# Patient Record
Sex: MALE | Race: BLACK OR AFRICAN AMERICAN | URBAN - METROPOLITAN AREA
[De-identification: names, ages, dates, MRNs, and addresses within clinical notes are randomized per-mention and may not be internally consistent; named-entity substitution may affect disease eponyms.]

---

## 2016-12-31 NOTE — ED BEHAVIORAL HEALTH NOTE - BEHAVIORAL HEALTH NOTE
TELEPSYCHIATRY ATTENDING NOTE (ADDENDUM TO PSYCHIATRIC ASSESSMENT FROM EARLIER THIS EVENING): During initial evaluation, Patient was offered voluntary admission which he declined. Patient returned to the [facility] ED after discharge a short time later this time seeking voluntary psychiatric inpatient admission to [address]. Patient can sign himself in voluntarily (legal document 9.13) and admitted to [facility]. Writer already gave verbal notification to Unit [facility]; they have a male bed available. TELEPSYCHIATRY ATTENDING NOTE (ADDENDUM TO PSYCHIATRIC ASSESSMENT FROM EARLIER THIS EVENING): During initial evaluation, Patient was offered voluntary admission which he declined. Patient returned to the [facility] ED after discharge a short time later this time seeking voluntary psychiatric inpatient admission to [facility].   PLAN:   Patient can sign himself in voluntarily (legal document 9.13)   - admit to [facility] / Dr KEN Jang (Writer already gave verbal notification to [facility]; they have a male bed available)  - reason for admission: psychosis; medication side effects/management   - routine checks; no CO needed  - no active medical issues  - substances are not a factor in this case  - regular diet  - ambulate as tolerated  - risperdal 1mg PO qhs, hydroxyzine 50mg PO qhs + PRNs of haldol/Ativan/benadryl

## 2016-12-31 NOTE — ED PROVIDER NOTE - PHYSICAL EXAMINATION
Gen: Alert, NAD  Head: NC, AT   Eyes: PERRL, EOMI, normal lids/conjunctiva  ENT: normal hearing, patent oropharynx without erythema/exudate, uvula midline  Neck: supple, no tenderness, Trachea midline  Pulm: Bilateral BS, normal resp effort, no wheeze/stridor/retractions  CV: RRR, no M/R/G, 2+ radial and dp pulses bl, no edema  Abd: soft, NT/ND, +BS, no hepatosplenomegaly  Mskel: extremities x4 with normal ROM and no joint effusions. no ctl spine ttp.   Skin: no rash, no bruising   Neuro: AAOx3, no sensory/motor deficits, CN 2-12 intact

## 2016-12-31 NOTE — ED PROVIDER NOTE - OBJECTIVE STATEMENT
Pertinent PMH/PSH/FHx/SHx and Review of Systems contained within:  26m hx of psychosis here earlierier today requests voluntary psych admission. after dc from earlier ed visit went home and had acute episode of anger where he threw food. still no hi, si but wants to get help with his anxiety and anger. believes he cannot control himself.   Fh and Sh not otherwise contributory  ROS otherwise negative

## 2016-12-31 NOTE — ED ADULT TRIAGE NOTE - CHIEF COMPLAINT QUOTE
" I am here to be admitted" Pt seen by psychiatry and discharged approx 1 hour ago and discharged home.  Pt calm and cooperative at triage.

## 2017-09-06 ENCOUNTER — EMERGENCY (EMERGENCY)
Facility: HOSPITAL | Age: 27
LOS: 0 days | Discharge: HOME | End: 2017-09-07
Attending: EMERGENCY MEDICINE
Payer: SELF-PAY

## 2017-09-06 VITALS
WEIGHT: 169.98 LBS | OXYGEN SATURATION: 97 % | DIASTOLIC BLOOD PRESSURE: 65 MMHG | HEART RATE: 74 BPM | SYSTOLIC BLOOD PRESSURE: 121 MMHG | HEIGHT: 70 IN | TEMPERATURE: 98 F | RESPIRATION RATE: 20 BRPM

## 2017-09-06 LAB
ALBUMIN SERPL ELPH-MCNC: 4.1 G/DL — SIGNIFICANT CHANGE UP (ref 3.3–5)
ALP SERPL-CCNC: 77 U/L — SIGNIFICANT CHANGE UP (ref 40–120)
ALT FLD-CCNC: 32 U/L — SIGNIFICANT CHANGE UP (ref 12–78)
AMPHET UR-MCNC: NEGATIVE — SIGNIFICANT CHANGE UP
ANION GAP SERPL CALC-SCNC: 9 MMOL/L — SIGNIFICANT CHANGE UP (ref 5–17)
APAP SERPL-MCNC: <2 UG/ML — LOW (ref 10–30)
AST SERPL-CCNC: 23 U/L — SIGNIFICANT CHANGE UP (ref 15–37)
BARBITURATES UR SCN-MCNC: NEGATIVE — SIGNIFICANT CHANGE UP
BASOPHILS # BLD AUTO: 0.1 K/UL — SIGNIFICANT CHANGE UP (ref 0–0.2)
BASOPHILS NFR BLD AUTO: 2 % — SIGNIFICANT CHANGE UP (ref 0–2)
BENZODIAZ UR-MCNC: NEGATIVE — SIGNIFICANT CHANGE UP
BILIRUB SERPL-MCNC: 0.4 MG/DL — SIGNIFICANT CHANGE UP (ref 0.2–1.2)
BUN SERPL-MCNC: 14 MG/DL — SIGNIFICANT CHANGE UP (ref 7–23)
CALCIUM SERPL-MCNC: 9 MG/DL — SIGNIFICANT CHANGE UP (ref 8.5–10.1)
CHLORIDE SERPL-SCNC: 106 MMOL/L — SIGNIFICANT CHANGE UP (ref 96–108)
CO2 SERPL-SCNC: 26 MMOL/L — SIGNIFICANT CHANGE UP (ref 22–31)
COCAINE METAB.OTHER UR-MCNC: NEGATIVE — SIGNIFICANT CHANGE UP
CREAT SERPL-MCNC: 1.19 MG/DL — SIGNIFICANT CHANGE UP (ref 0.5–1.3)
EOSINOPHIL # BLD AUTO: 0.4 K/UL — SIGNIFICANT CHANGE UP (ref 0–0.5)
EOSINOPHIL NFR BLD AUTO: 6.4 % — HIGH (ref 0–6)
ETHANOL SERPL-MCNC: <10 MG/DL — SIGNIFICANT CHANGE UP (ref 0–10)
GLUCOSE SERPL-MCNC: 106 MG/DL — HIGH (ref 70–99)
HCT VFR BLD CALC: 46.2 % — SIGNIFICANT CHANGE UP (ref 39–50)
HGB BLD-MCNC: 15.3 G/DL — SIGNIFICANT CHANGE UP (ref 13–17)
LYMPHOCYTES # BLD AUTO: 2.2 K/UL — SIGNIFICANT CHANGE UP (ref 1–3.3)
LYMPHOCYTES # BLD AUTO: 32.5 % — SIGNIFICANT CHANGE UP (ref 13–44)
MCHC RBC-ENTMCNC: 31.6 PG — SIGNIFICANT CHANGE UP (ref 27–34)
MCHC RBC-ENTMCNC: 33.2 GM/DL — SIGNIFICANT CHANGE UP (ref 32–36)
MCV RBC AUTO: 95.2 FL — SIGNIFICANT CHANGE UP (ref 80–100)
METHADONE UR-MCNC: NEGATIVE — SIGNIFICANT CHANGE UP
MONOCYTES # BLD AUTO: 0.6 K/UL — SIGNIFICANT CHANGE UP (ref 0–0.9)
MONOCYTES NFR BLD AUTO: 8.8 % — SIGNIFICANT CHANGE UP (ref 2–14)
NEUTROPHILS # BLD AUTO: 3.4 K/UL — SIGNIFICANT CHANGE UP (ref 1.8–7.4)
NEUTROPHILS NFR BLD AUTO: 50.2 % — SIGNIFICANT CHANGE UP (ref 43–77)
OPIATES UR-MCNC: NEGATIVE — SIGNIFICANT CHANGE UP
PCP SPEC-MCNC: SIGNIFICANT CHANGE UP
PCP UR-MCNC: NEGATIVE — SIGNIFICANT CHANGE UP
PLATELET # BLD AUTO: 247 K/UL — SIGNIFICANT CHANGE UP (ref 150–400)
POTASSIUM SERPL-MCNC: 3.8 MMOL/L — SIGNIFICANT CHANGE UP (ref 3.5–5.3)
POTASSIUM SERPL-SCNC: 3.8 MMOL/L — SIGNIFICANT CHANGE UP (ref 3.5–5.3)
PROT SERPL-MCNC: 8.4 GM/DL — HIGH (ref 6–8.3)
RBC # BLD: 4.86 M/UL — SIGNIFICANT CHANGE UP (ref 4.2–5.8)
RBC # FLD: 12.6 % — SIGNIFICANT CHANGE UP (ref 11–15)
SALICYLATES SERPL-MCNC: <1.7 MG/DL — LOW (ref 2.8–20)
SODIUM SERPL-SCNC: 141 MMOL/L — SIGNIFICANT CHANGE UP (ref 135–145)
THC UR QL: POSITIVE — SIGNIFICANT CHANGE UP
WBC # BLD: 6.7 K/UL — SIGNIFICANT CHANGE UP (ref 3.8–10.5)
WBC # FLD AUTO: 6.7 K/UL — SIGNIFICANT CHANGE UP (ref 3.8–10.5)

## 2017-09-06 PROCEDURE — 90792 PSYCH DIAG EVAL W/MED SRVCS: CPT | Mod: GT

## 2017-09-06 PROCEDURE — 99284 EMERGENCY DEPT VISIT MOD MDM: CPT

## 2017-09-06 NOTE — ED PROVIDER NOTE - PROGRESS NOTE DETAILS
Patient received on sign out from Dr. Tellez pending telepsych eval.  Patient seen and cleared by psych for outpatient treatment, see consult note, family at bedside.  Patient to follow up with Ольга garces in clinic, will treat with depakote per psych recommendation.  Discussed results and outcome of today's visit with the patient.  Patient advised to please follow up with their primary care doctor within the next 24 hours and return to the Emergency Department for worsening symptoms or any other concerns.  Patient advised that their doctor may call  to follow up on the specific results of the tests performed today in the emergency department.   Patient appears well on discharge.

## 2017-09-06 NOTE — ED ADULT NURSE REASSESSMENT NOTE - NS ED NURSE REASSESS COMMENT FT1
25 yo BIBA for psych eval, mother reports: "He has been treated for depression in the past, and he hasn't had treatment in months and she feels threatened by his presence. verbal and pointing in her face, where she leaves and stays at hotel. the girlfriend lives there also and is also depressed."   "I tried calling his insurance but he has not job" mother tearful, upset.
mother: Carmelo Barton can be reached at [phone], and W: [phone].

## 2017-09-06 NOTE — ED PROVIDER NOTE - OBJECTIVE STATEMENT
Pt is a 27 yo gentleman with a pmhx of prior psychiatric disease, nos per patient who presents to the ED with mother because he has not been taking his depakote due to insurance issues. Mother says that he is behaving erratically, and is acting out. Does not know if he is even sleeping. He denies any SI, HI, AVH. Mother says she cannot help take care of him anymore.

## 2017-09-06 NOTE — ED ADULT TRIAGE NOTE - CHIEF COMPLAINT QUOTE
patient here for psych evaluation , patient is not taking his medication for 2 months , patient denied SI , patient behave strange in ER

## 2017-09-06 NOTE — ED ADULT NURSE NOTE - OBJECTIVE STATEMENT
27 yo male BIBA from home, mother called, pt reports he doesn't have knowledge of why police were called. while smiling, affect. o no medication x 2 months Depakote unsure of dosage, pt verbalized "I have no insurance x 2 months." denies SI , not working at the moment.

## 2017-09-07 VITALS
DIASTOLIC BLOOD PRESSURE: 74 MMHG | RESPIRATION RATE: 20 BRPM | OXYGEN SATURATION: 100 % | SYSTOLIC BLOOD PRESSURE: 116 MMHG | HEART RATE: 65 BPM | TEMPERATURE: 98 F

## 2017-09-07 DIAGNOSIS — F43.25 ADJUSTMENT DISORDER WITH MIXED DISTURBANCE OF EMOTIONS AND CONDUCT: ICD-10-CM

## 2017-09-07 DIAGNOSIS — F32.9 MAJOR DEPRESSIVE DISORDER, SINGLE EPISODE, UNSPECIFIED: ICD-10-CM

## 2017-09-07 DIAGNOSIS — F41.9 ANXIETY DISORDER, UNSPECIFIED: ICD-10-CM

## 2017-09-07 DIAGNOSIS — F29 UNSPECIFIED PSYCHOSIS NOT DUE TO A SUBSTANCE OR KNOWN PHYSIOLOGICAL CONDITION: ICD-10-CM

## 2017-09-07 DIAGNOSIS — Z00.8 ENCOUNTER FOR OTHER GENERAL EXAMINATION: ICD-10-CM

## 2017-09-07 DIAGNOSIS — R45.89 OTHER SYMPTOMS AND SIGNS INVOLVING EMOTIONAL STATE: ICD-10-CM

## 2017-09-07 RX ORDER — DIVALPROEX SODIUM 500 MG/1
1 TABLET, DELAYED RELEASE ORAL
Qty: 30 | Refills: 0 | OUTPATIENT
Start: 2017-09-07 | End: 2017-09-22

## 2017-09-07 NOTE — ED BEHAVIORAL HEALTH ASSESSMENT NOTE - ADDITIONAL DETAILS / COMMENTS
Patient stopped taking his medications and did not continue with outpatient treatment following psychiatric hospitalization because he thought the medication was only slightly helpful, and he wasn't aware of it having a strong effect. Per girlfriend patient gets more easily agitated and upset when he drinks alcohol, and in the past 2 weeks patient has increased his alcohol intake.

## 2017-09-07 NOTE — ED BEHAVIORAL HEALTH ASSESSMENT NOTE - OTHER
girlfriend Alpha Juana Levine Mother Not assessed- telepsych. Not assessed- telepsych slightly slowed decreased spontaneity slightly constricted

## 2017-09-07 NOTE — ED BEHAVIORAL HEALTH ASSESSMENT NOTE - HPI (INCLUDE ILLNESS QUALITY, SEVERITY, DURATION, TIMING, CONTEXT, MODIFYING FACTORS, ASSOCIATED SIGNS AND SYMPTOMS)
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 Patient reports that he feels "neutral" and "fine," and defines that as not being agitated and as being appropriate given current stressors (unemployment, living with mom whom he does not have a positive relationship with, having school debt). Patient reports appetite has been fine, and his sleep varies but that this is not a change for him. Patient denies SI/HI and any other safety concerns, denies AH stating that he doesn't hear voices but "I have thoughts sometimes that people are looking at me strangely or talking about me," and reports that he doesn't understand why his mother called the police today and felt that he needed to go to the hospital. Patient states that due to insurance lapse he has not been taking his prescribed medication for the past 2 months, but that he is attempting to restart insurance and is open to taking medications again. Girlfriend is in agreement with patient's report, stating that patient has been doing "much better" since hospitalization in [place]. Girlfriend denies that patient has expressed suicidal thoughts, denies aggression/HI, denies thinking that patient is unsafe to return home. Girlfriend states that when patient gets very upset and in the context of drinking he has thrown his food down, but has never acted aggressively towards people. She states that patient can present as suspicious, and can become somewhat preoccupied thinking about why people are looking at him, but that this hasn't resulted in significant impairment in functioning for patient.    Mom reports that patient has been paranoid, curses and yells at mom, and that she believes patient has been drinking more because she saw empty bottles of wine and liquor in the home. Mom states that she wants patient to get a job and move out of her home. Mom reports she called 911 because a friend recommended that if she did so patient would be taken to hospital for evaluation. Mom denies that patient has made any threats and denies SI/HI. Mom acknowledges strained relationship with patient. Mother was unable to identify any clear reasons for thinking that patient should be hospitalized, other than not participating in outpatient treatment or taking medication and having some paranoid thoughts. 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 Patient reports that he feels "neutral" and "fine," and defines that as not being agitated and as being appropriate given current stressors (unemployment, living with mom whom he does not have a positive relationship with, having school debt). Patient reports appetite has been fine, and his sleep varies but that this is not a change for him. Patient denies SI/HI and any other safety concerns, denies AH stating that he doesn't hear voices but "I have thoughts sometimes that people are looking at me strangely or talking about me," and reports that he doesn't understand why his mother called the police today and felt that he needed to go to the hospital. Patient states that due to insurance lapse he has not been taking his prescribed medication for the past 2 months, but that he is attempting to restart insurance and is open to taking medications again. Patient is hopeful about the future, expecting to hear back about a potential job, and eager to begin school. Girlfriend is in agreement with patient's report, stating that patient has been doing "much better" since hospitalization in [place]. Girlfriend denies that patient has expressed suicidal thoughts, denies aggression/HI, denies thinking that patient is unsafe to return home. Girlfriend states that when patient gets very upset and in the context of drinking he has thrown his food down, but has never acted aggressively towards people. She states that patient can present as suspicious, and can become somewhat preoccupied thinking about why people are looking at him, but that this hasn't resulted in significant impairment in functioning for patient.    Mom reports that patient has been paranoid, curses and yells at mom, and that she believes patient has been drinking more because she saw empty bottles of wine and liquor in the home. Mom states that she wants patient to get a job and move out of her home. Mom reports she called 911 because a friend recommended that if she did so patient would be taken to hospital for evaluation. Mom denies that patient has made any threats and denies SI/HI. Mom acknowledges strained relationship with patient. Mother was unable to identify any clear reasons for thinking that patient should be hospitalized, other than not participating in outpatient treatment or taking medication and having some paranoid thoughts. 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 Patient reports that he feels "neutral" and "fine," and defines that as not being agitated and as being appropriate given current stressors (unemployment, living with mom whom he does not have a positive relationship with, having school debt). Patient reports appetite has been fine, and his sleep varies but that this is not a change for him. Patient denies SI/HI and any other safety concerns, denies AH stating that he doesn't hear voices but "I have thoughts sometimes that people are looking at me strangely or talking about me," and reports that he doesn't understand why his mother called the police today and felt that he needed to go to the hospital. Patient states that due to insurance lapse he has not been taking his prescribed medication for the past 2 months, but that he is attempting to restart insurance and is open to taking medications again. Patient is hopeful about the future, expecting to hear back about a potential job, and eager to begin school. Girlfriend is in agreement with patient's report, stating that patient has been doing "much better" since hospitalization in January. Girlfriend denies that patient has expressed suicidal thoughts, denies aggression/HI, denies thinking that patient is unsafe to return home. Girlfriend states that when patient gets very upset and in the context of drinking he has thrown his food down, but has never acted aggressively towards people. She states that patient can present as suspicious, and can become somewhat preoccupied thinking about why people are looking at him, but that this hasn't resulted in significant impairment in functioning for patient.    Mom reports that patient has been paranoid, curses and yells at mom, and that she believes patient has been drinking more because she saw empty bottles of wine and liquor in the home. Mom states that she wants patient to get a job and move out of her home. Mom reports she called 911 because a friend recommended that if she did so patient would be taken to hospital for evaluation. Mom denies that patient has made any threats and denies SI/HI. Mom acknowledges strained relationship with patient. Mother was unable to identify any clear reasons for thinking that patient should be hospitalized, other than not participating in outpatient treatment or taking medication and having some paranoid thoughts. 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 Patient reports that he feels "neutral" and "fine," and defines that as not being agitated and as being appropriate given current stressors (unemployment, living with mom whom he does not have a positive relationship with, having school debt). Patient reports appetite has been fine, and his sleep varies but that this is not a change for him. Patient denies SI/HI and any other safety concerns, denies AH stating that he doesn't hear voices but "I have thoughts sometimes that people are looking at me strangely or talking about me," and reports that he doesn't understand why his mother called the police today and felt that he needed to go to the hospital. Patient states that due to insurance lapse he has not been taking his prescribed medication for the past 2 months, but that he is attempting to restart insurance and is open to taking medications again. Patient is hopeful about the future, expecting to hear back about a potential job, and eager to begin school. Girlfriend is in agreement with patient's report, stating that patient has been doing "much better" since hospitalization in January. Girlfriend denies that patient has expressed suicidal thoughts, denies aggression/HI, denies thinking that patient is unsafe to return home. Girlfriend states that when patient gets very upset and in the context of drinking he has thrown his food down, but has never acted aggressively towards people. She states that patient can present as suspicious, and can become somewhat preoccupied thinking about why people are looking at him, but that this hasn't resulted in significant impairment in functioning for patient.    Mom reports that patient has been paranoid, curses and yells at mom, and that she believes patient has been drinking more because she saw empty bottles of wine and liquor in the home. Mom states that she wants patient to get a job and move out of her home. Mom reports she called 911 because a friend recommended that if she did so patient would be taken to hospital for evaluation. Mom denies that patient has made any threats and denies SI/HI. Mom acknowledges strained relationship with patient. Mother was unable to identify any clear reasons for thinking that patient should be hospitalized, other than not participating in outpatient treatment or taking medication and having some paranoid thoughts.    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  He states that he owes his mother money & that she has been frequently confronting him with this & berating him for not having found a job, which has led him to become upset & to isolate from her, which in turn has resulted in her becoming still more upset.

## 2017-09-07 NOTE — ED BEHAVIORAL HEALTH ASSESSMENT NOTE - RISK ASSESSMENT
Pt denies thoughts of violence or self-harm, has no history of violent or self-injurious behavior and is at low risk of harm to self or others at the present time.

## 2017-09-07 NOTE — ED BEHAVIORAL HEALTH ASSESSMENT NOTE - DESCRIPTION
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 Patient reports that he has been applying to jobs outside of t his field because he believes the environment is not healthy for him as it's fast-paced, loud, and "intense." Patient didn't finish college and is in the process of applying to go back to school. Patient and mom report a poor relationship, and mom wants patient to move out which patient plans to do in the near future. Patient was reported by ED staff to be calm and cooperative in ED. None

## 2017-09-07 NOTE — ED BEHAVIORAL HEALTH ASSESSMENT NOTE - DETAILS
Patient has had intermittent suicidal thoughts in the past, including in January 2017 prior to admission to [address]. Patient denied that he ever had a plan or intent. Mom- high blood pressure, paternal grandmother- diabetes 2 Mother Deliverance Mick informed of patient's discharge & plan to return home.

## 2017-09-07 NOTE — ED BEHAVIORAL HEALTH ASSESSMENT NOTE - OTHER PAST PSYCHIATRIC HISTORY (INCLUDE DETAILS REGARDING ONSET, COURSE OF ILLNESS, INPATIENT/OUTPATIENT TREATMENT)
see HPI Patient followed up at [facility] in [place] for medication management in the past. Per previous record: Patient reports that he went to [facility] in [place] after discharge and his 2x/day dosing of risperdal got changed to 1mg PO qhs due to daytime sedation and trazodone got switched to hydroxyzine for possible anxiety but now he has headaches. Patient followed up at [facility] in [place] for medication management in the past. Patient followed up at [facility] in [place] for medication management in the past.  No psychiatric history prior to his first hospitalization in 11/2016.

## 2017-09-07 NOTE — ED BEHAVIORAL HEALTH ASSESSMENT NOTE - REFERRAL / APPOINTMENT DETAILS
Pt provided with referral information for walk-in clinic at [facility] & instructed to f/u there within 1-2 days.

## 2017-09-07 NOTE — ED BEHAVIORAL HEALTH ASSESSMENT NOTE - DESCRIPTION (FIRST USE, LAST USE, QUANTITY, FREQUENCY, DURATION)
Currently 2-3 days/week use for the past few weeks, but reports that prior to this he only drank occasionally; denies hx of blackouts or withdrawal symptoms. Patient used 2 days ago but reports that prior to this he hadn't been using regularly. Patient reports that he occasionally smokes cigarettes in a social context; frequency- less than once per month.

## 2017-09-07 NOTE — ED BEHAVIORAL HEALTH ASSESSMENT NOTE - SUMMARY
Patient's history & presentation are most consistent with an adjustment disorder with disturbance of emotions & conduct occurring in the context of chronic interpersonal conflict with his mother, recently exacerbated by his unemployment, money owed to his mother & intensification of this conflict as a result. Neither the patient's mother nor his girlfriend have any acute concerns regarding his risk of dangerousness to himself or others.  Instead, his mother's decision to call EMS seems to have been a misguided attempt at coercing him into involuntary treatment, for which he fails to meet criteria.  Indeed, the level of psychopathology currently demonstrated by this pt appears to be relatively mild, and fails to meet criteria even for voluntary hospitalization.

## 2017-09-07 NOTE — ED BEHAVIORAL HEALTH ASSESSMENT NOTE - AXIS IV
Problems with access to healthcare services/Problem related to social environment/Housing problems/Occupational problems

## 2017-09-07 NOTE — ED BEHAVIORAL HEALTH ASSESSMENT NOTE - DIFFERENTIAL
Adjustment disorder with mixed disturbance of emotions & conduct; Psychosis not otherwise specified, r/o bipolar disorder vs schizophrenia (by history)

## 2017-09-21 PROBLEM — Z00.00 ENCOUNTER FOR PREVENTIVE HEALTH EXAMINATION: Status: ACTIVE | Noted: 2017-09-21

## 2018-02-22 ENCOUNTER — OUTPATIENT (OUTPATIENT)
Dept: OUTPATIENT SERVICES | Facility: HOSPITAL | Age: 28
LOS: 1 days | Discharge: STILL ADMITTED OR ADMINISTRATIVE | End: 2018-02-22

## 2018-02-23 DIAGNOSIS — F29 UNSPECIFIED PSYCHOSIS NOT DUE TO A SUBSTANCE OR KNOWN PHYSIOLOGICAL CONDITION: ICD-10-CM

## 2024-11-19 ENCOUNTER — OFFICE (OUTPATIENT)
Dept: URBAN - METROPOLITAN AREA CLINIC 77 | Facility: CLINIC | Age: 34
Setting detail: OPHTHALMOLOGY
End: 2024-11-19
Payer: COMMERCIAL

## 2024-11-19 DIAGNOSIS — H40.013: ICD-10-CM

## 2024-11-19 DIAGNOSIS — H52.13: ICD-10-CM

## 2024-11-19 PROCEDURE — 92015 DETERMINE REFRACTIVE STATE: CPT | Performed by: STUDENT IN AN ORGANIZED HEALTH CARE EDUCATION/TRAINING PROGRAM

## 2024-11-19 PROCEDURE — 92014 COMPRE OPH EXAM EST PT 1/>: CPT | Performed by: STUDENT IN AN ORGANIZED HEALTH CARE EDUCATION/TRAINING PROGRAM

## 2024-11-19 PROCEDURE — 92133 CPTRZD OPH DX IMG PST SGM ON: CPT | Performed by: STUDENT IN AN ORGANIZED HEALTH CARE EDUCATION/TRAINING PROGRAM

## 2024-11-19 ASSESSMENT — REFRACTION_AUTOREFRACTION
OS_CYLINDER: -0.50
OS_SPHERE: -3.00
OD_CYLINDER: -2.00
OD_SPHERE: -2.75
OD_AXIS: 022
OS_AXIS: 025

## 2024-11-19 ASSESSMENT — TONOMETRY
OD_IOP_MMHG: 15
OS_IOP_MMHG: 16

## 2024-11-19 ASSESSMENT — REFRACTION_MANIFEST
OS_AXIS: 000
OS_VA1: 20/20
OD_AXIS: 020
OS_SPHERE: -3.00
OD_CYLINDER: -1.00
OD_SPHERE: -2.50
OS_CYLINDER: SPHERE
OD_VA1: 20/30

## 2024-11-19 ASSESSMENT — KERATOMETRY
OD_AXISANGLE_DEGREES: 106
OS_K2POWER_DIOPTERS: 41.75
OS_K1POWER_DIOPTERS: 41.00
OS_AXISANGLE_DEGREES: 070
OD_K1POWER_DIOPTERS: 41.00
OD_K2POWER_DIOPTERS: 42.50

## 2024-11-19 ASSESSMENT — VISUAL ACUITY
OS_BCVA: 20/100
OD_BCVA: 20/200

## 2024-11-19 ASSESSMENT — CONFRONTATIONAL VISUAL FIELD TEST (CVF)
OD_FINDINGS: FULL
OS_FINDINGS: FULL

## 2025-03-18 ENCOUNTER — OFFICE (OUTPATIENT)
Dept: URBAN - METROPOLITAN AREA CLINIC 77 | Facility: CLINIC | Age: 35
Setting detail: OPHTHALMOLOGY
End: 2025-03-18
Payer: COMMERCIAL

## 2025-03-18 DIAGNOSIS — H40.013: ICD-10-CM

## 2025-03-18 PROCEDURE — 76514 ECHO EXAM OF EYE THICKNESS: CPT | Performed by: STUDENT IN AN ORGANIZED HEALTH CARE EDUCATION/TRAINING PROGRAM

## 2025-03-18 PROCEDURE — 92083 EXTENDED VISUAL FIELD XM: CPT | Performed by: STUDENT IN AN ORGANIZED HEALTH CARE EDUCATION/TRAINING PROGRAM

## 2025-03-18 PROCEDURE — 92012 INTRM OPH EXAM EST PATIENT: CPT | Performed by: STUDENT IN AN ORGANIZED HEALTH CARE EDUCATION/TRAINING PROGRAM

## 2025-03-18 ASSESSMENT — REFRACTION_MANIFEST
OS_SPHERE: -3.00
OS_AXIS: 000
OD_VA1: 20/30
OS_CYLINDER: SPHERE
OD_AXIS: 020
OS_VA1: 20/20
OD_SPHERE: -2.50
OD_CYLINDER: -1.00

## 2025-03-18 ASSESSMENT — KERATOMETRY
OD_K2POWER_DIOPTERS: 42.50
OS_K1POWER_DIOPTERS: 41.00
OD_AXISANGLE_DEGREES: 106
OS_AXISANGLE_DEGREES: 070
OS_K2POWER_DIOPTERS: 41.75
OD_K1POWER_DIOPTERS: 41.00

## 2025-03-18 ASSESSMENT — REFRACTION_AUTOREFRACTION
OS_AXIS: 025
OD_AXIS: 022
OD_SPHERE: -2.75
OS_CYLINDER: -0.50
OS_SPHERE: -3.00
OD_CYLINDER: -2.00

## 2025-03-18 ASSESSMENT — CONFRONTATIONAL VISUAL FIELD TEST (CVF)
OS_FINDINGS: FULL
OD_FINDINGS: FULL

## 2025-03-18 ASSESSMENT — PACHYMETRY
OS_CT_UM: 523
OD_CT_CORRECTION: 2
OD_CT_UM: 510
OS_CT_CORRECTION: 1

## 2025-03-18 ASSESSMENT — VISUAL ACUITY
OS_BCVA: 20/25
OD_BCVA: 20/20

## 2025-03-24 ENCOUNTER — NON-APPOINTMENT (OUTPATIENT)
Age: 35
End: 2025-03-24

## 2025-07-25 ENCOUNTER — OFFICE (OUTPATIENT)
Dept: URBAN - METROPOLITAN AREA CLINIC 77 | Facility: CLINIC | Age: 35
Setting detail: OPHTHALMOLOGY
End: 2025-07-25
Payer: COMMERCIAL

## 2025-07-25 DIAGNOSIS — H40.013: ICD-10-CM

## 2025-07-25 PROCEDURE — 92250 FUNDUS PHOTOGRAPHY W/I&R: CPT | Performed by: STUDENT IN AN ORGANIZED HEALTH CARE EDUCATION/TRAINING PROGRAM

## 2025-07-25 PROCEDURE — 99213 OFFICE O/P EST LOW 20 MIN: CPT | Performed by: STUDENT IN AN ORGANIZED HEALTH CARE EDUCATION/TRAINING PROGRAM

## 2025-07-25 ASSESSMENT — VISUAL ACUITY
OD_BCVA: 20/20-1
OS_BCVA: 20/20-2

## 2025-07-25 ASSESSMENT — REFRACTION_MANIFEST
OS_CYLINDER: SPHERE
OD_CYLINDER: -1.00
OS_VA1: 20/20
OD_SPHERE: -2.50
OD_AXIS: 020
OS_AXIS: 000
OS_SPHERE: -3.00
OD_VA1: 20/30

## 2025-07-25 ASSESSMENT — CONFRONTATIONAL VISUAL FIELD TEST (CVF)
OD_FINDINGS: FULL
OS_FINDINGS: FULL

## 2025-07-25 ASSESSMENT — KERATOMETRY
OS_K2POWER_DIOPTERS: 41.50
OS_K1POWER_DIOPTERS: 41.00
OS_AXISANGLE_DEGREES: 068
OD_K1POWER_DIOPTERS: 40.75
OD_AXISANGLE_DEGREES: 108
OD_K2POWER_DIOPTERS: 42.50

## 2025-07-25 ASSESSMENT — REFRACTION_AUTOREFRACTION
OS_AXIS: 014
OD_CYLINDER: -1.75
OD_SPHERE: -3.75
OD_AXIS: 019
OS_CYLINDER: -1.00
OS_SPHERE: -3.75

## 2025-07-25 ASSESSMENT — PACHYMETRY
OS_CT_CORRECTION: 1
OD_CT_UM: 510
OS_CT_UM: 523
OD_CT_CORRECTION: 2

## 2025-07-25 ASSESSMENT — TONOMETRY
OD_IOP_MMHG: 13
OS_IOP_MMHG: 17

## 2025-08-04 ENCOUNTER — NON-APPOINTMENT (OUTPATIENT)
Age: 35
End: 2025-08-04